# Patient Record
Sex: MALE | Race: AMERICAN INDIAN OR ALASKA NATIVE | NOT HISPANIC OR LATINO | Employment: OTHER | URBAN - METROPOLITAN AREA
[De-identification: names, ages, dates, MRNs, and addresses within clinical notes are randomized per-mention and may not be internally consistent; named-entity substitution may affect disease eponyms.]

---

## 2023-08-12 ENCOUNTER — HOSPITAL ENCOUNTER (OUTPATIENT)
Dept: LAB | Age: 68
Discharge: HOME | End: 2023-08-12
Attending: SPECIALIST

## 2023-08-12 DIAGNOSIS — R79.9 ABNORMAL BLOOD CHEMISTRY: ICD-10-CM

## 2023-08-12 DIAGNOSIS — R53.81 DEBILITY: ICD-10-CM

## 2023-08-12 DIAGNOSIS — Z00.00 ROUTINE GENERAL MEDICAL EXAMINATION AT A HEALTH CARE FACILITY: Primary | ICD-10-CM

## 2023-08-12 DIAGNOSIS — E55.9 AVITAMINOSIS D: ICD-10-CM

## 2023-08-12 DIAGNOSIS — R97.20 ELEVATED PROSTATE SPECIFIC ANTIGEN (PSA): ICD-10-CM

## 2023-08-12 DIAGNOSIS — Z00.00 ROUTINE GENERAL MEDICAL EXAMINATION AT A HEALTH CARE FACILITY: ICD-10-CM

## 2023-08-12 LAB
25(OH)D3+25(OH)D2 SERPL-MCNC: 44 NG/ML (ref 30–100)
ALBUMIN SERPL-MCNC: 3.9 G/DL (ref 3.6–5.1)
ALBUMIN/GLOB SERPL: 1.2 {RATIO} (ref 1–2.4)
ALP SERPL-CCNC: 51 UNITS/L (ref 45–117)
ALT SERPL-CCNC: 41 UNITS/L
ANION GAP SERPL CALC-SCNC: 11 MMOL/L (ref 7–19)
APPEARANCE UR: CLEAR
AST SERPL-CCNC: 30 UNITS/L
BACTERIA #/AREA URNS HPF: ABNORMAL /HPF
BILIRUB SERPL-MCNC: 0.4 MG/DL (ref 0.2–1)
BILIRUB UR QL STRIP: NEGATIVE
BUN SERPL-MCNC: 11 MG/DL (ref 6–20)
BUN/CREAT SERPL: 12 (ref 7–25)
CALCIUM SERPL-MCNC: 9.2 MG/DL (ref 8.4–10.2)
CHLORIDE SERPL-SCNC: 104 MMOL/L (ref 97–110)
CHOLEST SERPL-MCNC: 111 MG/DL
CHOLEST/HDLC SERPL: 3.7 {RATIO}
CO2 SERPL-SCNC: 27 MMOL/L (ref 21–32)
COLOR UR: ABNORMAL
CREAT SERPL-MCNC: 0.89 MG/DL (ref 0.67–1.17)
DEPRECATED RDW RBC: 39.9 FL (ref 39–50)
ERYTHROCYTE [DISTWIDTH] IN BLOOD: 13 % (ref 11–15)
FASTING DURATION TIME PATIENT: NORMAL H
FOLATE SERPL-MCNC: 20.7 NG/ML
GFR SERPLBLD BASED ON 1.73 SQ M-ARVRAT: >90 ML/MIN
GLOBULIN SER-MCNC: 3.3 G/DL (ref 2–4)
GLUCOSE SERPL-MCNC: 90 MG/DL (ref 70–99)
GLUCOSE UR STRIP-MCNC: NEGATIVE MG/DL
HBA1C MFR BLD: 6.7 % (ref 4.5–5.6)
HCT VFR BLD CALC: 43.7 % (ref 39–51)
HDLC SERPL-MCNC: 30 MG/DL
HGB BLD-MCNC: 13.9 G/DL (ref 13–17)
HGB UR QL STRIP: NEGATIVE
HYALINE CASTS #/AREA URNS LPF: ABNORMAL /LPF
KETONES UR STRIP-MCNC: NEGATIVE MG/DL
LDLC SERPL CALC-MCNC: 49 MG/DL
LEUKOCYTE ESTERASE UR QL STRIP: ABNORMAL
MCH RBC QN AUTO: 26.7 PG (ref 26–34)
MCHC RBC AUTO-ENTMCNC: 31.8 G/DL (ref 32–36.5)
MCV RBC AUTO: 84 FL (ref 78–100)
MUCOUS THREADS URNS QL MICRO: PRESENT
NITRITE UR QL STRIP: NEGATIVE
NONHDLC SERPL-MCNC: 81 MG/DL
NRBC BLD MANUAL-RTO: 0 /100 WBC
PH UR STRIP: 6 [PH] (ref 5–7)
PLATELET # BLD AUTO: 309 K/MCL (ref 140–450)
POTASSIUM SERPL-SCNC: 4.5 MMOL/L (ref 3.4–5.1)
PROT SERPL-MCNC: 7.2 G/DL (ref 6.4–8.2)
PROT UR STRIP-MCNC: NEGATIVE MG/DL
PSA SERPL-MCNC: 0.59 NG/ML
RBC # BLD: 5.2 MIL/MCL (ref 4.5–5.9)
RBC #/AREA URNS HPF: ABNORMAL /HPF
SODIUM SERPL-SCNC: 137 MMOL/L (ref 135–145)
SP GR UR STRIP: 1.02 (ref 1–1.03)
SQUAMOUS #/AREA URNS HPF: ABNORMAL /HPF
T4 SERPL-MCNC: 10.5 MCG/DL (ref 4.7–13.3)
TRIGL SERPL-MCNC: 158 MG/DL
TSH SERPL-ACNC: 1.95 MCUNITS/ML (ref 0.35–5)
URATE SERPL-MCNC: 3.2 MG/DL (ref 3.5–7.2)
UROBILINOGEN UR STRIP-MCNC: 0.2 MG/DL
VIT B12 SERPL-MCNC: 840 PG/ML (ref 211–911)
WBC # BLD: 8.1 K/MCL (ref 4.2–11)
WBC #/AREA URNS HPF: ABNORMAL /HPF

## 2023-08-12 PROCEDURE — 81001 URINALYSIS AUTO W/SCOPE: CPT | Performed by: SPECIALIST

## 2023-08-12 PROCEDURE — 80061 LIPID PANEL: CPT | Performed by: SPECIALIST

## 2023-08-12 PROCEDURE — 87086 URINE CULTURE/COLONY COUNT: CPT | Performed by: SPECIALIST

## 2023-08-12 PROCEDURE — 80053 COMPREHEN METABOLIC PANEL: CPT | Performed by: SPECIALIST

## 2023-08-12 PROCEDURE — 36415 COLL VENOUS BLD VENIPUNCTURE: CPT | Performed by: SPECIALIST

## 2023-08-12 PROCEDURE — 83036 HEMOGLOBIN GLYCOSYLATED A1C: CPT | Performed by: SPECIALIST

## 2023-08-12 PROCEDURE — 84443 ASSAY THYROID STIM HORMONE: CPT | Performed by: SPECIALIST

## 2023-08-12 PROCEDURE — 84550 ASSAY OF BLOOD/URIC ACID: CPT | Performed by: SPECIALIST

## 2023-08-12 PROCEDURE — 82306 VITAMIN D 25 HYDROXY: CPT | Performed by: SPECIALIST

## 2023-08-12 PROCEDURE — 82746 ASSAY OF FOLIC ACID SERUM: CPT | Performed by: SPECIALIST

## 2023-08-12 PROCEDURE — 84153 ASSAY OF PSA TOTAL: CPT | Performed by: SPECIALIST

## 2023-08-12 PROCEDURE — 84436 ASSAY OF TOTAL THYROXINE: CPT | Performed by: SPECIALIST

## 2023-08-12 PROCEDURE — 85027 COMPLETE CBC AUTOMATED: CPT | Performed by: SPECIALIST

## 2023-08-13 LAB — BACTERIA UR CULT: NO GROWTH

## 2023-11-11 ENCOUNTER — HOSPITAL ENCOUNTER (OUTPATIENT)
Dept: LAB | Age: 68
Discharge: HOME | End: 2023-11-11

## 2024-01-18 ENCOUNTER — LAB SERVICES (OUTPATIENT)
Dept: LAB | Age: 69
End: 2024-01-18

## 2024-01-18 ENCOUNTER — LAB SERVICES (OUTPATIENT)
Dept: LAB | Age: 69
End: 2024-01-18
Attending: SPECIALIST

## 2024-01-18 DIAGNOSIS — Z00.00 ENCOUNTER FOR GENERAL ADULT MEDICAL EXAMINATION WITHOUT ABNORMAL FINDINGS: Primary | ICD-10-CM

## 2024-01-18 DIAGNOSIS — R79.9 ABNORMAL FINDING OF BLOOD CHEMISTRY, UNSPECIFIED: ICD-10-CM

## 2024-01-18 DIAGNOSIS — R53.81 OTHER MALAISE: ICD-10-CM

## 2024-01-18 DIAGNOSIS — E11.9 TYPE 2 DIABETES MELLITUS WITHOUT COMPLICATIONS (CMD): ICD-10-CM

## 2024-01-18 DIAGNOSIS — E55.9 VITAMIN D DEFICIENCY, UNSPECIFIED: ICD-10-CM

## 2024-01-18 LAB
25(OH)D3+25(OH)D2 SERPL-MCNC: 39.2 NG/ML (ref 30–100)
ALBUMIN SERPL-MCNC: 4 G/DL (ref 3.6–5.1)
ALBUMIN/GLOB SERPL: 1.2 {RATIO} (ref 1–2.4)
ALP SERPL-CCNC: 57 UNITS/L (ref 45–117)
ALT SERPL-CCNC: 34 UNITS/L
ANION GAP SERPL CALC-SCNC: 9 MMOL/L (ref 7–19)
APPEARANCE UR: CLEAR
AST SERPL-CCNC: 22 UNITS/L
BACTERIA #/AREA URNS HPF: ABNORMAL /HPF
BILIRUB SERPL-MCNC: 0.4 MG/DL (ref 0.2–1)
BILIRUB UR QL STRIP: NEGATIVE
BUN SERPL-MCNC: 11 MG/DL (ref 6–20)
BUN/CREAT SERPL: 13 (ref 7–25)
CALCIUM SERPL-MCNC: 9.6 MG/DL (ref 8.4–10.2)
CHLORIDE SERPL-SCNC: 106 MMOL/L (ref 97–110)
CHOLEST SERPL-MCNC: 114 MG/DL
CHOLEST/HDLC SERPL: 3.7 {RATIO}
CO2 SERPL-SCNC: 26 MMOL/L (ref 21–32)
COLOR UR: COLORLESS
CREAT SERPL-MCNC: 0.88 MG/DL (ref 0.67–1.17)
CREAT UR-MCNC: 59.5 MG/DL
DEPRECATED RDW RBC: 38.5 FL (ref 39–50)
EGFRCR SERPLBLD CKD-EPI 2021: >90 ML/MIN/{1.73_M2}
ERYTHROCYTE [DISTWIDTH] IN BLOOD: 12.5 % (ref 11–15)
FASTING DURATION TIME PATIENT: 12 HOURS (ref 0–999)
FOLATE SERPL-MCNC: 8.5 NG/ML
GLOBULIN SER-MCNC: 3.4 G/DL (ref 2–4)
GLUCOSE SERPL-MCNC: 102 MG/DL (ref 70–99)
GLUCOSE UR STRIP-MCNC: NEGATIVE MG/DL
HBA1C MFR BLD: 6.8 % (ref 4.5–5.6)
HCT VFR BLD CALC: 42.5 % (ref 39–51)
HDLC SERPL-MCNC: 31 MG/DL
HGB BLD-MCNC: 13.7 G/DL (ref 13–17)
HGB UR QL STRIP: NEGATIVE
HYALINE CASTS #/AREA URNS LPF: ABNORMAL /LPF
KETONES UR STRIP-MCNC: NEGATIVE MG/DL
LDLC SERPL CALC-MCNC: 56 MG/DL
LEUKOCYTE ESTERASE UR QL STRIP: ABNORMAL
MCH RBC QN AUTO: 27.5 PG (ref 26–34)
MCHC RBC AUTO-ENTMCNC: 32.2 G/DL (ref 32–36.5)
MCV RBC AUTO: 85.2 FL (ref 78–100)
MICROALBUMIN UR-MCNC: <0.5 MG/DL
MICROALBUMIN/CREAT UR: NORMAL MG/G{CREAT}
MUCOUS THREADS URNS QL MICRO: PRESENT
NITRITE UR QL STRIP: NEGATIVE
NONHDLC SERPL-MCNC: 83 MG/DL
NRBC BLD MANUAL-RTO: 0 /100 WBC
PH UR STRIP: 5 [PH] (ref 5–7)
PLATELET # BLD AUTO: 325 K/MCL (ref 140–450)
POTASSIUM SERPL-SCNC: 5.1 MMOL/L (ref 3.4–5.1)
PROT SERPL-MCNC: 7.4 G/DL (ref 6.4–8.2)
PROT UR STRIP-MCNC: NEGATIVE MG/DL
RBC # BLD: 4.99 MIL/MCL (ref 4.5–5.9)
RBC #/AREA URNS HPF: ABNORMAL /HPF
SODIUM SERPL-SCNC: 136 MMOL/L (ref 135–145)
SP GR UR STRIP: 1.01 (ref 1–1.03)
SQUAMOUS #/AREA URNS HPF: ABNORMAL /HPF
T4 SERPL-MCNC: 11.2 MCG/DL (ref 4.7–13.3)
TRIGL SERPL-MCNC: 137 MG/DL
TSH SERPL-ACNC: 2.13 MCUNITS/ML (ref 0.35–5)
URATE SERPL-MCNC: 2.6 MG/DL (ref 3.5–7.2)
UROBILINOGEN UR STRIP-MCNC: 0.2 MG/DL
VIT B12 SERPL-MCNC: 1956 PG/ML (ref 211–911)
WBC # BLD: 7.9 K/MCL (ref 4.2–11)
WBC #/AREA URNS HPF: ABNORMAL /HPF

## 2024-01-18 PROCEDURE — 80053 COMPREHEN METABOLIC PANEL: CPT

## 2024-01-18 PROCEDURE — 82607 VITAMIN B-12: CPT

## 2024-01-18 PROCEDURE — 84550 ASSAY OF BLOOD/URIC ACID: CPT

## 2024-01-18 PROCEDURE — 81001 URINALYSIS AUTO W/SCOPE: CPT

## 2024-01-18 PROCEDURE — 84443 ASSAY THYROID STIM HORMONE: CPT

## 2024-01-18 PROCEDURE — 82043 UR ALBUMIN QUANTITATIVE: CPT

## 2024-01-18 PROCEDURE — 82306 VITAMIN D 25 HYDROXY: CPT

## 2024-01-18 PROCEDURE — 84436 ASSAY OF TOTAL THYROXINE: CPT

## 2024-01-18 PROCEDURE — 87186 SC STD MICRODIL/AGAR DIL: CPT

## 2024-01-18 PROCEDURE — 83036 HEMOGLOBIN GLYCOSYLATED A1C: CPT

## 2024-01-18 PROCEDURE — 85027 COMPLETE CBC AUTOMATED: CPT

## 2024-01-18 PROCEDURE — 36415 COLL VENOUS BLD VENIPUNCTURE: CPT

## 2024-01-18 PROCEDURE — 80061 LIPID PANEL: CPT

## 2024-01-20 LAB — BACTERIA UR CULT: ABNORMAL

## 2024-02-13 ENCOUNTER — APPOINTMENT (OUTPATIENT)
Dept: AUDIOLOGY | Age: 69
End: 2024-02-13

## 2024-02-13 DIAGNOSIS — H90.3 SENSORINEURAL HEARING LOSS (SNHL) OF BOTH EARS: Primary | ICD-10-CM

## 2024-02-13 PROCEDURE — 92557 COMPREHENSIVE HEARING TEST: CPT | Performed by: AUDIOLOGIST

## 2024-03-29 ENCOUNTER — TELEPHONE (OUTPATIENT)
Dept: CARDIOLOGY | Age: 69
End: 2024-03-29

## 2024-04-03 ENCOUNTER — LAB SERVICES (OUTPATIENT)
Dept: LAB | Age: 69
End: 2024-04-03

## 2024-04-03 ENCOUNTER — OFFICE VISIT (OUTPATIENT)
Dept: CARDIOLOGY | Age: 69
End: 2024-04-03

## 2024-04-03 VITALS
HEIGHT: 65 IN | HEART RATE: 72 BPM | BODY MASS INDEX: 22.63 KG/M2 | DIASTOLIC BLOOD PRESSURE: 73 MMHG | WEIGHT: 135.8 LBS | SYSTOLIC BLOOD PRESSURE: 111 MMHG

## 2024-04-03 DIAGNOSIS — I25.810 CORONARY ARTERY DISEASE INVOLVING AUTOLOGOUS ARTERY CORONARY BYPASS GRAFT WITHOUT ANGINA PECTORIS: ICD-10-CM

## 2024-04-03 DIAGNOSIS — I10 HYPERTENSION, UNSPECIFIED TYPE: Primary | ICD-10-CM

## 2024-04-03 DIAGNOSIS — N39.0 UTI (URINARY TRACT INFECTION): Primary | ICD-10-CM

## 2024-04-03 DIAGNOSIS — E78.5 HYPERLIPIDEMIA, UNSPECIFIED HYPERLIPIDEMIA TYPE: ICD-10-CM

## 2024-04-03 DIAGNOSIS — R06.02 SOB (SHORTNESS OF BREATH): ICD-10-CM

## 2024-04-03 LAB
APPEARANCE UR: CLEAR
BACTERIA #/AREA URNS HPF: ABNORMAL /HPF
BILIRUB UR QL STRIP: NEGATIVE
COLOR UR: COLORLESS
GLUCOSE UR STRIP-MCNC: >1000 MG/DL
HGB UR QL STRIP: NEGATIVE
HYALINE CASTS #/AREA URNS LPF: ABNORMAL /LPF
KETONES UR STRIP-MCNC: NEGATIVE MG/DL
LEUKOCYTE ESTERASE UR QL STRIP: ABNORMAL
NITRITE UR QL STRIP: NEGATIVE
PH UR STRIP: 7.5 [PH] (ref 5–7)
PROT UR STRIP-MCNC: NEGATIVE MG/DL
RBC #/AREA URNS HPF: ABNORMAL /HPF
SP GR UR STRIP: 1.02 (ref 1–1.03)
SQUAMOUS #/AREA URNS HPF: ABNORMAL /HPF
UROBILINOGEN UR STRIP-MCNC: 0.2 MG/DL
WBC #/AREA URNS HPF: ABNORMAL /HPF

## 2024-04-03 PROCEDURE — 87086 URINE CULTURE/COLONY COUNT: CPT

## 2024-04-03 PROCEDURE — 81001 URINALYSIS AUTO W/SCOPE: CPT

## 2024-04-03 RX ORDER — TELMISARTAN 40 MG/1
40 TABLET ORAL DAILY
COMMUNITY
End: 2024-04-03 | Stop reason: SDUPTHER

## 2024-04-03 RX ORDER — NITROFURANTOIN MACROCRYSTALS 100 MG/1
CAPSULE ORAL
COMMUNITY
Start: 2024-02-26 | End: 2024-04-03

## 2024-04-03 RX ORDER — ROSUVASTATIN CALCIUM 20 MG/1
20 TABLET, COATED ORAL DAILY
COMMUNITY
Start: 2023-11-27 | End: 2024-04-03 | Stop reason: SDUPTHER

## 2024-04-03 RX ORDER — TELMISARTAN 40 MG/1
40 TABLET ORAL DAILY
Qty: 90 TABLET | Refills: 3 | Status: SHIPPED | OUTPATIENT
Start: 2024-04-03

## 2024-04-03 RX ORDER — CLOPIDOGREL BISULFATE 75 MG/1
75 TABLET ORAL DAILY
COMMUNITY
End: 2024-04-03 | Stop reason: SDUPTHER

## 2024-04-03 RX ORDER — CLOPIDOGREL BISULFATE 75 MG/1
75 TABLET ORAL DAILY
Qty: 90 TABLET | Refills: 3 | Status: SHIPPED | OUTPATIENT
Start: 2024-04-03

## 2024-04-03 RX ORDER — PREDNISONE 10 MG/1
TABLET ORAL
COMMUNITY
Start: 2024-01-16 | End: 2024-04-03

## 2024-04-03 RX ORDER — ROSUVASTATIN CALCIUM 20 MG/1
20 TABLET, COATED ORAL DAILY
Qty: 90 TABLET | Refills: 3 | Status: SHIPPED | OUTPATIENT
Start: 2024-04-03

## 2024-04-03 RX ORDER — FENOFIBRATE 160 MG/1
TABLET ORAL
COMMUNITY
Start: 2024-02-19

## 2024-04-03 RX ORDER — METFORMIN HYDROCHLORIDE 500 MG/1
TABLET, EXTENDED RELEASE ORAL
COMMUNITY
Start: 2024-02-19

## 2024-04-03 RX ORDER — DOXYCYCLINE HYCLATE 100 MG
100 TABLET ORAL 2 TIMES DAILY
COMMUNITY
Start: 2024-01-16 | End: 2024-04-03

## 2024-04-03 SDOH — HEALTH STABILITY: PHYSICAL HEALTH: ON AVERAGE, HOW MANY MINUTES DO YOU ENGAGE IN EXERCISE AT THIS LEVEL?: 30 MIN

## 2024-04-03 SDOH — HEALTH STABILITY: PHYSICAL HEALTH: ON AVERAGE, HOW MANY DAYS PER WEEK DO YOU ENGAGE IN MODERATE TO STRENUOUS EXERCISE (LIKE A BRISK WALK)?: 7 DAYS

## 2024-04-03 ASSESSMENT — PATIENT HEALTH QUESTIONNAIRE - PHQ9
2. FEELING DOWN, DEPRESSED OR HOPELESS: NOT AT ALL
CLINICAL INTERPRETATION OF PHQ2 SCORE: NO FURTHER SCREENING NEEDED
SUM OF ALL RESPONSES TO PHQ9 QUESTIONS 1 AND 2: 0
SUM OF ALL RESPONSES TO PHQ9 QUESTIONS 1 AND 2: 0
1. LITTLE INTEREST OR PLEASURE IN DOING THINGS: NOT AT ALL

## 2024-04-05 LAB — BACTERIA UR CULT: ABNORMAL

## 2024-09-16 ENCOUNTER — APPOINTMENT (OUTPATIENT)
Dept: CARDIOLOGY | Age: 69
End: 2024-09-16
Attending: INTERNAL MEDICINE

## 2024-09-17 ENCOUNTER — LAB SERVICES (OUTPATIENT)
Dept: FAMILY MEDICINE | Age: 69
End: 2024-09-17

## 2024-09-17 DIAGNOSIS — R53.81 DEBILITY: ICD-10-CM

## 2024-09-17 DIAGNOSIS — E13.22 OTHER SPECIFIED DIABETES MELLITUS WITH STAGE 3 CHRONIC KIDNEY DISEASE, UNSPECIFIED WHETHER LONG TERM INSULIN USE, UNSPECIFIED WHETHER STAGE 3A OR 3B CKD  (CMD): ICD-10-CM

## 2024-09-17 DIAGNOSIS — E53.8 ADENOSYLCOBALAMIN SYNTHESIS DEFECT: ICD-10-CM

## 2024-09-17 DIAGNOSIS — E55.9 AVITAMINOSIS D: ICD-10-CM

## 2024-09-17 DIAGNOSIS — R79.9 ABNORMAL BLOOD CHEMISTRY: ICD-10-CM

## 2024-09-17 DIAGNOSIS — R97.20 ELEVATED PROSTATE SPECIFIC ANTIGEN (PSA): ICD-10-CM

## 2024-09-17 DIAGNOSIS — N18.30 OTHER SPECIFIED DIABETES MELLITUS WITH STAGE 3 CHRONIC KIDNEY DISEASE, UNSPECIFIED WHETHER LONG TERM INSULIN USE, UNSPECIFIED WHETHER STAGE 3A OR 3B CKD  (CMD): ICD-10-CM

## 2024-09-17 DIAGNOSIS — E78.5 HYPERLIPIDEMIA, UNSPECIFIED HYPERLIPIDEMIA TYPE: ICD-10-CM

## 2024-09-17 DIAGNOSIS — Z00.00 ROUTINE GENERAL MEDICAL EXAMINATION AT A HEALTH CARE FACILITY: Primary | ICD-10-CM

## 2024-09-17 LAB
25(OH)D3+25(OH)D2 SERPL-MCNC: 62.9 NG/ML (ref 30–100)
ALBUMIN SERPL-MCNC: 4.1 G/DL (ref 3.6–5.1)
ALBUMIN/GLOB SERPL: 1.3 {RATIO} (ref 1–2.4)
ALP SERPL-CCNC: 64 UNITS/L (ref 45–117)
ALT SERPL-CCNC: 40 UNITS/L
ANION GAP SERPL CALC-SCNC: 12 MMOL/L (ref 7–19)
APPEARANCE UR: CLEAR
AST SERPL-CCNC: 28 UNITS/L
BACTERIA #/AREA URNS HPF: ABNORMAL /HPF
BASOPHILS # BLD: 0.1 K/MCL (ref 0–0.3)
BASOPHILS NFR BLD: 1 %
BILIRUB SERPL-MCNC: 0.4 MG/DL (ref 0.2–1)
BILIRUB UR QL STRIP: NEGATIVE
BUN SERPL-MCNC: 8 MG/DL (ref 6–20)
BUN/CREAT SERPL: 9 (ref 7–25)
CALCIUM SERPL-MCNC: 9.7 MG/DL (ref 8.4–10.2)
CHLORIDE SERPL-SCNC: 103 MMOL/L (ref 97–110)
CHOLEST SERPL-MCNC: 134 MG/DL
CHOLEST/HDLC SERPL: 5.4 {RATIO}
CO2 SERPL-SCNC: 26 MMOL/L (ref 21–32)
COLOR UR: COLORLESS
CREAT SERPL-MCNC: 0.9 MG/DL (ref 0.67–1.17)
CREAT UR-MCNC: 22.05 MG/DL
DEPRECATED RDW RBC: 40.9 FL (ref 39–50)
EGFRCR SERPLBLD CKD-EPI 2021: >90 ML/MIN/{1.73_M2}
EOSINOPHIL # BLD: 0.4 K/MCL (ref 0–0.5)
EOSINOPHIL NFR BLD: 6 %
ERYTHROCYTE [DISTWIDTH] IN BLOOD: 13.6 % (ref 11–15)
FASTING DURATION TIME PATIENT: ABNORMAL H
GLOBULIN SER-MCNC: 3.2 G/DL (ref 2–4)
GLUCOSE SERPL-MCNC: 114 MG/DL (ref 70–99)
GLUCOSE UR STRIP-MCNC: NEGATIVE MG/DL
HBA1C MFR BLD: 7.8 % (ref 4.5–5.6)
HCT VFR BLD CALC: 42.4 % (ref 39–51)
HDLC SERPL-MCNC: 25 MG/DL
HGB BLD-MCNC: 13.7 G/DL (ref 13–17)
HGB UR QL STRIP: NEGATIVE
HYALINE CASTS #/AREA URNS LPF: ABNORMAL /LPF
IMM GRANULOCYTES # BLD AUTO: 0 K/MCL (ref 0–0.2)
IMM GRANULOCYTES # BLD: 0 %
KETONES UR STRIP-MCNC: NEGATIVE MG/DL
LDLC SERPL CALC-MCNC: 69 MG/DL
LEUKOCYTE ESTERASE UR QL STRIP: ABNORMAL
LYMPHOCYTES # BLD: 2.1 K/MCL (ref 1–4)
LYMPHOCYTES NFR BLD: 34 %
MCH RBC QN AUTO: 26.7 PG (ref 26–34)
MCHC RBC AUTO-ENTMCNC: 32.3 G/DL (ref 32–36.5)
MCV RBC AUTO: 82.5 FL (ref 78–100)
MICROALBUMIN UR-MCNC: <0.5 MG/DL
MICROALBUMIN/CREAT UR: NORMAL MG/G{CREAT}
MONOCYTES # BLD: 0.4 K/MCL (ref 0.3–0.9)
MONOCYTES NFR BLD: 6 %
NEUTROPHILS # BLD: 3.3 K/MCL (ref 1.8–7.7)
NEUTROPHILS NFR BLD: 53 %
NITRITE UR QL STRIP: NEGATIVE
NONHDLC SERPL-MCNC: 109 MG/DL
NRBC BLD MANUAL-RTO: 0 /100 WBC
PH UR STRIP: 6 [PH] (ref 5–7)
PLATELET # BLD AUTO: 304 K/MCL (ref 140–450)
POTASSIUM SERPL-SCNC: 5.1 MMOL/L (ref 3.4–5.1)
PROT SERPL-MCNC: 7.3 G/DL (ref 6.4–8.2)
PROT UR STRIP-MCNC: NEGATIVE MG/DL
PSA SERPL-MCNC: 0.71 NG/ML
RBC # BLD: 5.14 MIL/MCL (ref 4.5–5.9)
RBC #/AREA URNS HPF: ABNORMAL /HPF
SODIUM SERPL-SCNC: 136 MMOL/L (ref 135–145)
SP GR UR STRIP: 1 (ref 1–1.03)
SQUAMOUS #/AREA URNS HPF: ABNORMAL /HPF
TRANS CELLS #/AREA URNS HPF: ABNORMAL /HPF
TRIGL SERPL-MCNC: 198 MG/DL
TSH SERPL-ACNC: 1.88 MCUNITS/ML (ref 0.35–5)
URATE SERPL-MCNC: 2.7 MG/DL (ref 3.5–7.2)
UROBILINOGEN UR STRIP-MCNC: 0.2 MG/DL
WBC # BLD: 6.3 K/MCL (ref 4.2–11)
WBC #/AREA URNS HPF: ABNORMAL /HPF

## 2024-09-17 PROCEDURE — 82746 ASSAY OF FOLIC ACID SERUM: CPT | Performed by: CLINICAL MEDICAL LABORATORY

## 2024-09-17 PROCEDURE — 87086 URINE CULTURE/COLONY COUNT: CPT | Performed by: CLINICAL MEDICAL LABORATORY

## 2024-09-17 PROCEDURE — 84153 ASSAY OF PSA TOTAL: CPT | Performed by: CLINICAL MEDICAL LABORATORY

## 2024-09-17 PROCEDURE — 82607 VITAMIN B-12: CPT | Performed by: CLINICAL MEDICAL LABORATORY

## 2024-09-17 PROCEDURE — 84436 ASSAY OF TOTAL THYROXINE: CPT | Performed by: CLINICAL MEDICAL LABORATORY

## 2024-09-18 ENCOUNTER — APPOINTMENT (OUTPATIENT)
Dept: CARDIOLOGY | Age: 69
End: 2024-09-18
Attending: INTERNAL MEDICINE

## 2024-09-18 DIAGNOSIS — R06.02 SOB (SHORTNESS OF BREATH): ICD-10-CM

## 2024-09-18 LAB
AORTIC VALVE AREA (AVA): 0.66
AV PEAK GRADIENT (AVPG): 7
AV PEAK VELOCITY (AVPV): 1.34
AV STENOSIS SEVERITY TEXT: NORMAL
AVI LVOT PEAK GRADIENT (LVOTMG): 0.7
E WAVE DECELARATION TIME (MDT): 8.5
FOLATE SERPL-MCNC: 6.2 NG/ML
LEFT INTERNAL DIMENSION IN SYSTOLE (LVSD): 0.8
LEFT VENTRICULAR INTERNAL DIMENSION IN DIASTOLE (LVDD): 2.4
LEFT VENTRICULAR POSTERIOR WALL IN END DIASTOLE (LVPW): 3.6
LV EF: NORMAL %
LVOT VTI (LVOTVTI): 1.2
MV E TISSUE VEL MED (MESV): 11.4
MV E WAVE VEL/E TISSUE VEL MED(MSR): 7.71
MV PEAK A VELOCITY (MVPAV): 196
MV PEAK E VELOCITY (MVPEV): 0.71
RV END SYSTOLIC LONGITUDINAL STRAIN FREE WALL (RVGS): 2
T4 SERPL-MCNC: 12.5 MCG/DL (ref 4.7–13.3)
TRICUSPID VALVE PEAK REGURGITATION VELOCITY (TRPV): 2.9
TV ESTIMATED RIGHT ARTERIAL PRESSURE (RAP): 12.6
VIT B12 SERPL-MCNC: 385 PG/ML (ref 211–911)

## 2024-09-18 PROCEDURE — 93306 TTE W/DOPPLER COMPLETE: CPT | Performed by: INTERNAL MEDICINE

## 2024-09-19 LAB — BACTERIA UR CULT: NO GROWTH

## 2024-10-07 ENCOUNTER — APPOINTMENT (OUTPATIENT)
Dept: CARDIOLOGY | Age: 69
End: 2024-10-07

## 2024-10-07 VITALS
WEIGHT: 134.04 LBS | OXYGEN SATURATION: 97 % | SYSTOLIC BLOOD PRESSURE: 114 MMHG | RESPIRATION RATE: 18 BRPM | BODY MASS INDEX: 22.33 KG/M2 | HEIGHT: 65 IN | DIASTOLIC BLOOD PRESSURE: 58 MMHG | HEART RATE: 65 BPM

## 2024-10-07 DIAGNOSIS — R09.89 CAROTID BRUIT, UNSPECIFIED LATERALITY: ICD-10-CM

## 2024-10-07 DIAGNOSIS — E78.2 MIXED HYPERLIPIDEMIA: ICD-10-CM

## 2024-10-07 DIAGNOSIS — R06.02 SOB (SHORTNESS OF BREATH): ICD-10-CM

## 2024-10-07 DIAGNOSIS — I1A.0 RESISTANT HYPERTENSION: Primary | ICD-10-CM

## 2024-10-07 DIAGNOSIS — I25.810 CORONARY ARTERY DISEASE INVOLVING AUTOLOGOUS ARTERY CORONARY BYPASS GRAFT WITHOUT ANGINA PECTORIS: ICD-10-CM

## 2024-10-07 PROCEDURE — 3074F SYST BP LT 130 MM HG: CPT | Performed by: INTERNAL MEDICINE

## 2024-10-07 PROCEDURE — 3078F DIAST BP <80 MM HG: CPT | Performed by: INTERNAL MEDICINE

## 2024-10-07 PROCEDURE — 99214 OFFICE O/P EST MOD 30 MIN: CPT | Performed by: INTERNAL MEDICINE

## 2024-10-07 SDOH — HEALTH STABILITY: PHYSICAL HEALTH: ON AVERAGE, HOW MANY DAYS PER WEEK DO YOU ENGAGE IN MODERATE TO STRENUOUS EXERCISE (LIKE A BRISK WALK)?: 7 DAYS

## 2024-10-07 SDOH — HEALTH STABILITY: PHYSICAL HEALTH: ON AVERAGE, HOW MANY MINUTES DO YOU ENGAGE IN EXERCISE AT THIS LEVEL?: 20 MIN

## 2024-10-07 ASSESSMENT — PATIENT HEALTH QUESTIONNAIRE - PHQ9
CLINICAL INTERPRETATION OF PHQ2 SCORE: NO FURTHER SCREENING NEEDED
2. FEELING DOWN, DEPRESSED OR HOPELESS: NOT AT ALL
SUM OF ALL RESPONSES TO PHQ9 QUESTIONS 1 AND 2: 0
1. LITTLE INTEREST OR PLEASURE IN DOING THINGS: NOT AT ALL
SUM OF ALL RESPONSES TO PHQ9 QUESTIONS 1 AND 2: 0

## 2024-11-04 ENCOUNTER — TELEPHONE (OUTPATIENT)
Dept: CARDIOLOGY | Age: 69
End: 2024-11-04

## 2024-12-14 ENCOUNTER — TELEPHONE (OUTPATIENT)
Dept: CARDIOLOGY | Age: 69
End: 2024-12-14

## 2024-12-16 ENCOUNTER — TELEPHONE (OUTPATIENT)
Dept: CARDIOLOGY | Age: 69
End: 2024-12-16

## 2024-12-16 ENCOUNTER — HOSPITAL ENCOUNTER (OUTPATIENT)
Dept: LAB | Age: 69
Discharge: HOME | End: 2024-12-16

## 2024-12-16 DIAGNOSIS — E11.9 TYPE 2 DIABETES MELLITUS WITHOUT COMPLICATIONS  (CMD): ICD-10-CM

## 2024-12-16 DIAGNOSIS — R79.9 ABNORMAL FINDING OF BLOOD CHEMISTRY, UNSPECIFIED: ICD-10-CM

## 2024-12-16 DIAGNOSIS — E53.8 DEFICIENCY OF OTHER SPECIFIED B GROUP VITAMINS: ICD-10-CM

## 2024-12-16 DIAGNOSIS — I10 ESSENTIAL (PRIMARY) HYPERTENSION: Primary | ICD-10-CM

## 2024-12-16 DIAGNOSIS — E78.5 HYPERLIPIDEMIA, UNSPECIFIED: ICD-10-CM

## 2024-12-16 DIAGNOSIS — I10 ESSENTIAL (PRIMARY) HYPERTENSION: ICD-10-CM

## 2024-12-16 DIAGNOSIS — Z00.00 ENCOUNTER FOR GENERAL ADULT MEDICAL EXAMINATION WITHOUT ABNORMAL FINDINGS: ICD-10-CM

## 2024-12-16 PROCEDURE — 80053 COMPREHEN METABOLIC PANEL: CPT | Performed by: SPECIALIST

## 2024-12-16 PROCEDURE — 36415 COLL VENOUS BLD VENIPUNCTURE: CPT | Performed by: SPECIALIST

## 2024-12-16 PROCEDURE — 85027 COMPLETE CBC AUTOMATED: CPT | Performed by: SPECIALIST

## 2024-12-16 PROCEDURE — 80061 LIPID PANEL: CPT | Performed by: SPECIALIST

## 2024-12-16 PROCEDURE — 83036 HEMOGLOBIN GLYCOSYLATED A1C: CPT | Performed by: SPECIALIST

## 2024-12-16 PROCEDURE — 86140 C-REACTIVE PROTEIN: CPT | Performed by: SPECIALIST

## 2024-12-16 PROCEDURE — 81003 URINALYSIS AUTO W/O SCOPE: CPT | Performed by: CLINICAL MEDICAL LABORATORY

## 2024-12-16 PROCEDURE — 84443 ASSAY THYROID STIM HORMONE: CPT | Performed by: SPECIALIST

## 2024-12-17 LAB
ALBUMIN SERPL-MCNC: 4.5 G/DL (ref 3.4–5)
ALBUMIN/GLOB SERPL: 1.3 {RATIO} (ref 1–2.4)
ALP SERPL-CCNC: 71 UNITS/L (ref 45–117)
ALT SERPL-CCNC: 22 UNITS/L
ANION GAP SERPL CALC-SCNC: 11 MMOL/L (ref 7–19)
APPEARANCE UR: CLEAR
AST SERPL-CCNC: 20 UNITS/L
BILIRUB SERPL-MCNC: 0.5 MG/DL (ref 0.2–1)
BILIRUB UR QL STRIP: NEGATIVE
BUN SERPL-MCNC: 11 MG/DL (ref 6–20)
BUN/CREAT SERPL: 11 (ref 7–25)
CALCIUM SERPL-MCNC: 11.1 MG/DL (ref 8.4–10.2)
CHLORIDE SERPL-SCNC: 102 MMOL/L (ref 97–110)
CHOLEST SERPL-MCNC: 137 MG/DL
CHOLEST/HDLC SERPL: 5.1 {RATIO}
CO2 SERPL-SCNC: 25 MMOL/L (ref 21–32)
COLOR UR: COLORLESS
CREAT SERPL-MCNC: 0.98 MG/DL (ref 0.67–1.17)
CRP SERPL-MCNC: 20.3 MG/L
DEPRECATED RDW RBC: 41 FL (ref 39–50)
EGFRCR SERPLBLD CKD-EPI 2021: 85 ML/MIN/{1.73_M2}
ERYTHROCYTE [DISTWIDTH] IN BLOOD: 13.4 % (ref 11–15)
FASTING DURATION TIME PATIENT: 6 HOURS (ref 0–999)
GLOBULIN SER-MCNC: 3.4 G/DL (ref 2–4)
GLUCOSE SERPL-MCNC: 91 MG/DL (ref 70–99)
GLUCOSE UR STRIP-MCNC: NEGATIVE MG/DL
HBA1C MFR BLD: 7.3 % (ref 4.5–5.6)
HCT VFR BLD CALC: 42.7 % (ref 39–51)
HDLC SERPL-MCNC: 27 MG/DL
HGB BLD-MCNC: 13.8 G/DL (ref 13–17)
HGB UR QL STRIP: NEGATIVE
KETONES UR STRIP-MCNC: NEGATIVE MG/DL
LDLC SERPL CALC-MCNC: 73 MG/DL
LEUKOCYTE ESTERASE UR QL STRIP: NEGATIVE
MCH RBC QN AUTO: 27 PG (ref 26–34)
MCHC RBC AUTO-ENTMCNC: 32.3 G/DL (ref 32–36.5)
MCV RBC AUTO: 83.6 FL (ref 78–100)
NITRITE UR QL STRIP: NEGATIVE
NONHDLC SERPL-MCNC: 110 MG/DL
NRBC BLD MANUAL-RTO: 0 /100 WBC
PH UR STRIP: 6.5 [PH] (ref 5–7)
PLATELET # BLD AUTO: 358 K/MCL (ref 140–450)
POTASSIUM SERPL-SCNC: 4.5 MMOL/L (ref 3.4–5.1)
PROT SERPL-MCNC: 7.9 G/DL (ref 6.4–8.2)
PROT UR STRIP-MCNC: NEGATIVE MG/DL
RBC # BLD: 5.11 MIL/MCL (ref 4.5–5.9)
SODIUM SERPL-SCNC: 133 MMOL/L (ref 135–145)
SP GR UR STRIP: <1.005 (ref 1–1.03)
TRIGL SERPL-MCNC: 185 MG/DL
TSH SERPL-ACNC: 2.51 MCUNITS/ML (ref 0.35–5)
UROBILINOGEN UR STRIP-MCNC: 0.2 MG/DL
WBC # BLD: 12.3 K/MCL (ref 4.2–11)

## 2025-03-05 ENCOUNTER — TELEPHONE (OUTPATIENT)
Dept: CARDIOLOGY | Age: 70
End: 2025-03-05

## 2025-03-06 ENCOUNTER — TELEPHONE (OUTPATIENT)
Dept: CARDIOLOGY | Age: 70
End: 2025-03-06

## 2025-03-06 DIAGNOSIS — I25.810 CORONARY ARTERY DISEASE INVOLVING AUTOLOGOUS ARTERY CORONARY BYPASS GRAFT WITHOUT ANGINA PECTORIS: ICD-10-CM

## 2025-03-06 DIAGNOSIS — R09.89 CAROTID BRUIT, UNSPECIFIED LATERALITY: Primary | ICD-10-CM

## 2025-03-20 ENCOUNTER — APPOINTMENT (OUTPATIENT)
Dept: CARDIOLOGY | Age: 70
End: 2025-03-20
Attending: INTERNAL MEDICINE

## 2025-04-18 ENCOUNTER — LAB ENCOUNTER (OUTPATIENT)
Dept: LAB | Age: 70
End: 2025-04-18
Attending: SPECIALIST
Payer: COMMERCIAL

## 2025-04-18 ENCOUNTER — HOSPITAL ENCOUNTER (OUTPATIENT)
Dept: GENERAL RADIOLOGY | Age: 70
Discharge: HOME | End: 2025-04-18
Attending: SPECIALIST
Payer: COMMERCIAL

## 2025-04-18 DIAGNOSIS — E78.5 HYPERLIPEMIA: ICD-10-CM

## 2025-04-18 DIAGNOSIS — Z00.00 ROUTINE GENERAL MEDICAL EXAMINATION AT A HEALTH CARE FACILITY: ICD-10-CM

## 2025-04-18 DIAGNOSIS — M25.562 LEFT KNEE PAIN: ICD-10-CM

## 2025-04-18 DIAGNOSIS — R97.20 ELEVATED PROSTATE SPECIFIC ANTIGEN (PSA): Primary | ICD-10-CM

## 2025-04-18 DIAGNOSIS — E11.9 DIABETES MELLITUS (HCC): ICD-10-CM

## 2025-04-18 DIAGNOSIS — E55.9 AVITAMINOSIS D: ICD-10-CM

## 2025-04-18 DIAGNOSIS — R79.9 ABNORMAL BLOOD CHEMISTRY: ICD-10-CM

## 2025-04-18 DIAGNOSIS — R53.81 DEBILITY, UNSPECIFIED: ICD-10-CM

## 2025-04-18 DIAGNOSIS — E53.8 BIOTIN-(PROPIONYL-COA-CARBOXYLASE) LIGASE DEFICIENCY: ICD-10-CM

## 2025-04-18 LAB
ALBUMIN SERPL-MCNC: 5.1 G/DL (ref 3.2–4.8)
ALBUMIN/GLOB SERPL: 2.1 {RATIO} (ref 1–2)
ALP LIVER SERPL-CCNC: 47 U/L (ref 45–117)
ALT SERPL-CCNC: 17 U/L (ref 10–49)
ANION GAP SERPL CALC-SCNC: 9 MMOL/L (ref 0–18)
AST SERPL-CCNC: 26 U/L (ref ?–34)
BASOPHILS # BLD AUTO: 0.06 X10(3) UL (ref 0–0.2)
BASOPHILS NFR BLD AUTO: 0.8 %
BILIRUB SERPL-MCNC: 0.6 MG/DL (ref 0.2–1.1)
BILIRUB UR QL STRIP.AUTO: NEGATIVE
BUN BLD-MCNC: 8 MG/DL (ref 9–23)
CALCIUM BLD-MCNC: 10 MG/DL (ref 8.7–10.6)
CHLORIDE SERPL-SCNC: 103 MMOL/L (ref 98–112)
CHOLEST SERPL-MCNC: 108 MG/DL (ref ?–200)
CLARITY UR REFRACT.AUTO: CLEAR
CO2 SERPL-SCNC: 25 MMOL/L (ref 21–32)
COLOR UR AUTO: COLORLESS
CREAT BLD-MCNC: 1 MG/DL (ref 0.7–1.3)
CREAT UR-SCNC: 18.4 MG/DL
EGFRCR SERPLBLD CKD-EPI 2021: 82 ML/MIN/1.73M2 (ref 60–?)
EOSINOPHIL # BLD AUTO: 0.33 X10(3) UL (ref 0–0.7)
EOSINOPHIL NFR BLD AUTO: 4.6 %
ERYTHROCYTE [DISTWIDTH] IN BLOOD BY AUTOMATED COUNT: 13.6 %
EST. AVERAGE GLUCOSE BLD GHB EST-MCNC: 154 MG/DL (ref 68–126)
FASTING PATIENT LIPID ANSWER: YES
FASTING STATUS PATIENT QL REPORTED: YES
FOLATE SERPL-MCNC: 8.5 NG/ML (ref 5.4–?)
GLOBULIN PLAS-MCNC: 2.4 G/DL (ref 2–3.5)
GLUCOSE BLD-MCNC: 98 MG/DL (ref 70–99)
GLUCOSE UR STRIP.AUTO-MCNC: NORMAL MG/DL
HBA1C MFR BLD: 7 % (ref ?–5.7)
HCT VFR BLD AUTO: 40.4 % (ref 39–53)
HDLC SERPL-MCNC: 28 MG/DL (ref 40–59)
HGB BLD-MCNC: 12.9 G/DL (ref 13–17.5)
IMM GRANULOCYTES # BLD AUTO: 0.02 X10(3) UL (ref 0–1)
IMM GRANULOCYTES NFR BLD: 0.3 %
KETONES UR STRIP.AUTO-MCNC: NEGATIVE MG/DL
LDLC SERPL CALC-MCNC: 56 MG/DL (ref ?–100)
LEUKOCYTE ESTERASE UR QL STRIP.AUTO: 75
LYMPHOCYTES # BLD AUTO: 2.12 X10(3) UL (ref 1–4)
LYMPHOCYTES NFR BLD AUTO: 29.8 %
MCH RBC QN AUTO: 27.2 PG (ref 26–34)
MCHC RBC AUTO-ENTMCNC: 31.9 G/DL (ref 31–37)
MCV RBC AUTO: 85.1 FL (ref 80–100)
MICROALBUMIN UR-MCNC: <0.3 MG/DL
MONOCYTES # BLD AUTO: 0.48 X10(3) UL (ref 0.1–1)
MONOCYTES NFR BLD AUTO: 6.8 %
NEUTROPHILS # BLD AUTO: 4.1 X10 (3) UL (ref 1.5–7.7)
NEUTROPHILS # BLD AUTO: 4.1 X10(3) UL (ref 1.5–7.7)
NEUTROPHILS NFR BLD AUTO: 57.7 %
NITRITE UR QL STRIP.AUTO: NEGATIVE
NONHDLC SERPL-MCNC: 80 MG/DL (ref ?–130)
OSMOLALITY SERPL CALC.SUM OF ELEC: 282 MOSM/KG (ref 275–295)
PH UR STRIP.AUTO: 7 [PH] (ref 5–8)
PLATELET # BLD AUTO: 358 10(3)UL (ref 150–450)
POTASSIUM SERPL-SCNC: 4.5 MMOL/L (ref 3.5–5.1)
PROT SERPL-MCNC: 7.5 G/DL (ref 5.7–8.2)
PROT UR STRIP.AUTO-MCNC: NEGATIVE MG/DL
PSA SERPL-MCNC: 0.64 NG/ML (ref ?–4)
RBC # BLD AUTO: 4.75 X10(6)UL (ref 3.8–5.8)
RBC UR QL AUTO: NEGATIVE
SODIUM SERPL-SCNC: 137 MMOL/L (ref 136–145)
SP GR UR STRIP.AUTO: <1.005 (ref 1–1.03)
T4 SERPL-MCNC: 9.7 UG/DL (ref 4.5–10.9)
TRIGL SERPL-MCNC: 134 MG/DL (ref 30–149)
TSI SER-ACNC: 2.05 UIU/ML (ref 0.55–4.78)
URATE SERPL-MCNC: 3.2 MG/DL (ref 3.7–9.2)
UROBILINOGEN UR STRIP.AUTO-MCNC: NORMAL MG/DL
VIT B12 SERPL-MCNC: 266 PG/ML (ref 211–911)
VIT D+METAB SERPL-MCNC: 17.8 NG/ML (ref 30–100)
VLDLC SERPL CALC-MCNC: 20 MG/DL (ref 0–30)
WBC # BLD AUTO: 7.1 X10(3) UL (ref 4–11)

## 2025-04-18 PROCEDURE — 80053 COMPREHEN METABOLIC PANEL: CPT

## 2025-04-18 PROCEDURE — 82570 ASSAY OF URINE CREATININE: CPT

## 2025-04-18 PROCEDURE — 84443 ASSAY THYROID STIM HORMONE: CPT

## 2025-04-18 PROCEDURE — 85025 COMPLETE CBC W/AUTO DIFF WBC: CPT

## 2025-04-18 PROCEDURE — 83036 HEMOGLOBIN GLYCOSYLATED A1C: CPT

## 2025-04-18 PROCEDURE — 82306 VITAMIN D 25 HYDROXY: CPT

## 2025-04-18 PROCEDURE — 84550 ASSAY OF BLOOD/URIC ACID: CPT

## 2025-04-18 PROCEDURE — 87086 URINE CULTURE/COLONY COUNT: CPT

## 2025-04-18 PROCEDURE — 80061 LIPID PANEL: CPT

## 2025-04-18 PROCEDURE — 81001 URINALYSIS AUTO W/SCOPE: CPT

## 2025-04-18 PROCEDURE — 82746 ASSAY OF FOLIC ACID SERUM: CPT

## 2025-04-18 PROCEDURE — 84153 ASSAY OF PSA TOTAL: CPT

## 2025-04-18 PROCEDURE — 82043 UR ALBUMIN QUANTITATIVE: CPT

## 2025-04-18 PROCEDURE — 82607 VITAMIN B-12: CPT

## 2025-04-18 PROCEDURE — 73560 X-RAY EXAM OF KNEE 1 OR 2: CPT | Performed by: SPECIALIST

## 2025-04-18 PROCEDURE — 36415 COLL VENOUS BLD VENIPUNCTURE: CPT

## 2025-04-18 PROCEDURE — 84436 ASSAY OF TOTAL THYROXINE: CPT

## 2025-06-06 ENCOUNTER — HOSPITAL ENCOUNTER (OUTPATIENT)
Dept: GENERAL RADIOLOGY | Age: 70
Discharge: HOME | End: 2025-06-06
Attending: SPECIALIST
Payer: COMMERCIAL

## 2025-06-06 ENCOUNTER — ORDER TRANSCRIPTION (OUTPATIENT)
Dept: PHYSICAL THERAPY | Facility: HOSPITAL | Age: 70
End: 2025-06-06

## 2025-06-06 DIAGNOSIS — M54.9 UPPER BACK PAIN: Primary | ICD-10-CM

## 2025-06-06 DIAGNOSIS — M54.9 UPPER BACK PAIN: ICD-10-CM

## 2025-06-06 DIAGNOSIS — M54.2 NECK PAIN: ICD-10-CM

## 2025-06-06 DIAGNOSIS — M25.562 LEFT KNEE PAIN: ICD-10-CM

## 2025-06-06 PROCEDURE — 72050 X-RAY EXAM NECK SPINE 4/5VWS: CPT | Performed by: SPECIALIST

## 2025-06-06 PROCEDURE — 72072 X-RAY EXAM THORAC SPINE 3VWS: CPT | Performed by: SPECIALIST

## 2025-06-09 ENCOUNTER — OFFICE VISIT (OUTPATIENT)
Dept: PHYSICAL THERAPY | Age: 70
End: 2025-06-09
Attending: SPECIALIST
Payer: COMMERCIAL

## 2025-06-09 ENCOUNTER — TELEPHONE (OUTPATIENT)
Dept: PHYSICAL THERAPY | Facility: HOSPITAL | Age: 70
End: 2025-06-09

## 2025-06-09 DIAGNOSIS — M54.9 UPPER BACK PAIN: Primary | ICD-10-CM

## 2025-06-09 DIAGNOSIS — M25.562 LEFT KNEE PAIN: ICD-10-CM

## 2025-06-09 PROCEDURE — 97110 THERAPEUTIC EXERCISES: CPT | Performed by: PHYSICAL THERAPIST

## 2025-06-09 PROCEDURE — 97161 PT EVAL LOW COMPLEX 20 MIN: CPT | Performed by: PHYSICAL THERAPIST

## 2025-06-09 NOTE — PROGRESS NOTES
SPINE EVALUATION:     Diagnosis:   Upper back pain (M54.9)  Left knee pain (M25.562) Patient:  Harmeet MUNOZ Edinson (70 year old, male)        Referring Provider: Adam Branham  Today's Date   6/9/2025    Precautions:  Hearing Impairment (Language Barrier)   Date of Evaluation: 06/09/25  Next MD visit: No data recorded  Date of Surgery: n/a     PATIENT SUMMARY   Completed w/ pt's daughter, Jennifer, serving to assist w/ interpretation as they request.  Summary of chief complaints: upper back pain across bilaterally, mid back pain in the center; L knee/ LE pain  History of current condition: sudden onset L knee pain over the last mo - so severe hardly walking - entire leg was paining- given ice pack, helps - feeling better - not as bad. No injury to the knee. Was walking a lot. No swelling to the knee. Back issues 12-15 yrs. Was in [place]- MRI over there- last part before tailbone - disc degeneration. No injuries/ trauma. Morning is better - symptoms start in the afternoon & get worse at night better once sleeps for 2 hrs at night (or sleeps in afternoon).   Pain level: current 0 /10, at worst 7 /10  Description of symptoms: ok w/ stair descent, walking   Prior level of function: back symptoms worsening over time. Reports about 10 yrs ago did have PT in [place] as he had completely stopped walking at this point due to the back pain, was able to walk when completed PT.  Current limitations: stair climbing (ascent), lifting from floor, stand->sit, prolonged standing  Pt goals: \"pain should be gone\"  Red flag signs/symptoms: Pt denies dizziness, drop attacks, dysphagia, dysarthria, diplopia; Pt denies changes in bowel/bladder function, saddle anesthesia; Pt denies pain that wakes in sleep, fever, recent trauma, history of CA, pain unchanged with movement/activity    Past medical history was reviewed with Harmeet.  Significant findings include: bypass surgery 6 yrs ago- \"can lift as much as he is comfortable  w/ the weight\", DM, HTN. No unintentional weight loss/ gain  Imaging/Tests: Xray L knee: FINDINGS:   No acute fracture or dislocation.  Joint spaces and bony alignment are maintained.  Superior patellar enthesopathy.  No significant joint effusion. Xray thoracic spine: FINDINGS:   There is normal thoracic kyphosis.  No significant lateral curvature.  The visualized vertebral body heights are maintained.  Minimal endplate degenerative changes. Median sternotomy changes are noted.  The visualized lung hartman are clear.   Harmeet  has no past medical history on file.  He  has no past surgical history on file.    ASSESSMENT  Harmeet presents to physical therapy evaluation with primary c/o upper back pain across bilaterally, mid back pain in the center; L knee/ LE pain. The results of the objective tests and measures show impairments in posture, gait, flexibility, ROM, strength. Functional deficits include but are not limited to stair climbing (ascent), lifting from floor, stand->sit, prolonged standing. Signs and symptoms are consistent with diagnosis of Upper back pain (M54.9)  Left knee pain (M25.562). Pt and PT discussed evaluation findings, pathology, POC and HEP. Pt voiced understanding and performs HEP correctly without reported pain. Skilled Physical Therapy is medically necessary to address the above impairments and reach functional goals.    OBJECTIVE:      Musculoskeletal:  Observation/Posture: forward head posture; scapular abduction; rounded shoulders; increased thoracic kyphosis (sacral sitting; stance: swayback w/ ant pelvis; B pes planus, B genu varum)   Accessory Motion:   NT  Palpation: TTP along B LS & medial scap mm, B T/L PSM & generally along spinous processes thoracolumbar spine; no TTP B PSIS, no pain provocation to L knee including along patellar tendon/ tib-fem med jt line/ post HS mm     ROM and Strength:  (* denotes performed with pain)  Trunk ROM     Flex Mod restricted     Ext NT     R L     Side bend Mod restricted Mod restricted     Rotation Mod restricted; Magdiel restricted (lumbar; thoracic) Mod restricted; Magdiel restricted (lumbar; thoracic)   ,   Hip   ROM MMT (-/5)    R L R L     Flex (L2) WNL WNL 5 5     Abd NT NT 4- 3+     ER WNL WNL NT NT     IR Mod restricted Mod restricted NT NT    ,   Knee   ROM MMT (-/5)    R L R L     Flex WNL WNL* (ERP both active & passive)- pain post knee; unable to replicate pain w/ palpation 5 5     Ext (L3) WNL WNL 5 5     ,   Ankle/Foot   ROM    R L     DF (L4) 5 5       Flexibility:  UE Flexibility R L     Pec Major mod restricted mod restricted   ,   LE Flexibility R L     Hamstrings mod restricted mod restricted     Piriformis min restricted min restricted       Balance and Functional Mobility:  Gait: pt ambulates on level ground with -- (mild L intermittent crossover pattern; decreased R arm swing, decreased trunk rot R/L).     Today's Treatment and Response:   Pt education was provided on exam findings, treatment diagnosis, treatment plan, expectations, and prognosis.  Pt aware will be sent back to referring provider if symptoms are not adequately resolved w/ PT intervention.    Today's Treatment       6/9/2025   Spine Treatment   Therapeutic Exercise HEP instruction   Therapeutic Exercise Minutes 9   Evaluation Minutes 36   Total Time Of Timed Procedures 9   Total Time Of Service-Based Procedures 36   Total Treatment Time 45   HEP Access Code: [email or web address]  URL: [email or web address]  Date: 06/09/2025  Prepared by: Glo Brewer    Exercises  - Doorway Pec Stretch at 90 Degrees Abduction  - 2 x daily - 7 x weekly - 4 sets - 30 sec hold  - Seated Trunk Rotation - Arms Crossed  - 2 x daily - 7 x weekly - 1 sets - 10 reps - 5 sec hold  - Supine Lower Trunk Rotation  - 2 x daily - 7 x weekly - 1 sets - 10 reps - 5 sec hold  - Hooklying Hamstring Stretch  - 2 x daily - 7 x weekly - 1 sets - 10 reps - 5 sec hold        Patient was instructed in  and issued a HEP for: Access Code: [email or web address]  URL: [email or web address]  Date: 06/09/2025  Prepared by: Glo Brewer    Exercises  - Doorway Pec Stretch at 90 Degrees Abduction  - 2 x daily - 7 x weekly - 4 sets - 30 sec hold  - Seated Trunk Rotation - Arms Crossed  - 2 x daily - 7 x weekly - 1 sets - 10 reps - 5 sec hold  - Supine Lower Trunk Rotation  - 2 x daily - 7 x weekly - 1 sets - 10 reps - 5 sec hold  - Hooklying Hamstring Stretch  - 2 x daily - 7 x weekly - 1 sets - 10 reps - 5 sec hold    Charges:  PT EVAL: Low Complexity, 1TE  In agreement with evaluation findings and clinical rationale, this evaluation involved LOW COMPLEXITY decision making due to no personal factors/comorbidities, 1-2 body structures involved/activity limitations, and stable symptoms as documented in the evaluation.                                                                         PLAN OF CARE:    Goals: (to be met in 8 visits)    Not Met Progress Toward Partially Met Met   Pt will improve L hip Abduction strength to 4/5 to ascend 1 flight of stairs reciprocally without UE assist. [] [] [] []   Pt will improve lumbar spine AROM flexion to Min restricted or better to allow increase ease with bending forward for lifting from floor. [] [] [] []   Pt will report pain at worst 3/10 w/ stand->sit transfers. [] [] [] []   Pt will demo improved standing posture w/o VC's or manual cues for improved body mechanics to report ability to stand >60 minutes without pain for home activities. [] [] [] []   Pt will be independent and compliant with comprehensive HEP to maintain progress achieved in PT [] [] [] []          Frequency / Duration: Patient will be seen 1-2x/week or a total of 8  visits over a 90 day period. Treatment will include: Gait training; Manual Therapy; Neuromuscular Re-education; Therapeutic Activities; Self-Care Home Management; Therapeutic Exercise; Home Exercise Program instruction; Patient/Family  Education    Education or treatment limitation: Communication (language barrier)   Rehab Potential: good     LEFS Score  LEFS Score: (Patient-Rptd) 62.5 % (6/9/2025  9:18 AM)      Patient/Family/Caregiver was advised of these findings, precautions, and treatment options and has agreed to actively participate in planning and for this course of care.    Thank you for your referral. Please co-sign or sign and return this letter via fax as soon as possible to [phone]. If you have any questions, please contact me at Dept: [phone]    Sincerely,  Electronically signed by therapist: Glo Brewer PT  Physician's certification required: Yes  I certify the need for these services furnished under this plan of treatment and while under my care.    X___________________________________________________ Date____________________    Certification From: 6/9/2025  To: 9/7/2025

## 2025-06-13 ENCOUNTER — OFFICE VISIT (OUTPATIENT)
Dept: PHYSICAL THERAPY | Age: 70
End: 2025-06-13
Attending: SPECIALIST
Payer: COMMERCIAL

## 2025-06-13 PROCEDURE — 97110 THERAPEUTIC EXERCISES: CPT | Performed by: PHYSICAL THERAPIST

## 2025-06-16 ENCOUNTER — OFFICE VISIT (OUTPATIENT)
Dept: PHYSICAL THERAPY | Age: 70
End: 2025-06-16
Attending: SPECIALIST
Payer: COMMERCIAL

## 2025-06-16 PROCEDURE — 97110 THERAPEUTIC EXERCISES: CPT

## 2025-06-16 PROCEDURE — 97140 MANUAL THERAPY 1/> REGIONS: CPT

## 2025-06-16 NOTE — PROGRESS NOTES
Patient: Harmeet Neves (70 year old, male) Referring Provider:  Insurance:   Diagnosis:   [facility]  [facility]   Date of Surgery: No data recorded Next MD visit:  N/A   Precautions:  Hearing Impairment (Language Barrier) No data recorded Referral Information:    Date of Evaluation: Req: 5, Auth: 5, Exp: 8/31/2025    No data recorded POC Auth Visits:          Today's Date   6/16/2025    Subjective  \"My mid back feels 50 % better since the stasr of PT but my left knee continue to hurts on the back 7/10 when I try to bend it . I have hard time with stairs negotiation due to tightness and pain on the back of my knee . Current pain behing my L knee \".       Pain: 7/10     Objective                 Assessment  Initiated STM to the posterior L knee to address patient c/o of lateral knee pain and connective tissue tightness with bending activites with significant decrease in posterior knee  tightness following manual intrevention ( as per patient subjective report ).Conitnued with core and thoracic spine stabilization activites and added yellow TB to scapula strength activites performed in WB to further challenge functional trunk stability for improved posture with improved body mechnics with standing prolonged home  activites in order to decrease pain .Provided verbal and visual cues to ensure correct scapula recruitment .    Goals (to be met in  8 visits  )     Not Met Progress Toward Partially Met Met    Pt will improve L hip Abduction strength to 4/5 to ascend 1 flight of stairs reciprocally without UE assist. []  []  []  []    Pt will improve lumbar spine AROM flexion to Min restricted or better to allow increase ease with bending forward for lifting from floor. []  []  []  []    Pt will report pain at worst 3/10 w/ stand->sit transfers. []  []  []  []    Pt will demo improved standing posture w/o VC's or manual cues for improved body mechanics to report ability to stand >60 minutes without pain for  home activities. []  []  []  []    Pt will be independent and compliant with comprehensive HEP to maintain progress achieved in PT []  []  []  []                                                                                                                       Plan :     Continue PT as per current POC   Treatment Last 4 Visits  Treatment Day: 3       6/16/2025   LE Treatment   Therapeutic Exercise NU step x 6 min , level 5  Standing :  Doorway stretch for pectoral mm   10 x 10 sec hold   Seated :  Thoracic AROM of rotation 10 x 10 sec hold   Thoracic ext over yellow ball 2 x 10   Standing against wall :  B scapula retraction with yellow TB :  B sh ext 2 x 10   B sh horizontal abd 2 x 10   B sh ext 2 x 10   Supine :  LTR with lower legs on SB 2 x 10   TrA activation with :  Adductors activation 2 x 10   Abductors activation with fitness ring 2 x 10   Piriformis stretches ( ER /IR ) 10 x 10 sec hold   L HS stretch with strap 10 x 10 sec hold   L clam shell with 1 lbs 2 x 10   L hip abduction 2 x 10   L knee TKE gentle stretch x 2 min  Standing :  B gastroc stretch x 5 x 30 sec hold      Manual Therapy STM to L knee with patient in prone position   Supine :  PROM L knee flexion / extension with gentle end range stretch    Therapeutic Exercise Minutes 30   Manual Therapy Minutes 15   Total Time Of Timed Procedures 45   Total Time Of Service-Based Procedures 0   Total Treatment Time 45        HEP       Charges     manual x 1 , there - ex x 3

## 2025-06-20 ENCOUNTER — OFFICE VISIT (OUTPATIENT)
Dept: PHYSICAL THERAPY | Age: 70
End: 2025-06-20
Attending: SPECIALIST
Payer: COMMERCIAL

## 2025-06-20 PROCEDURE — 97140 MANUAL THERAPY 1/> REGIONS: CPT

## 2025-06-20 PROCEDURE — 97110 THERAPEUTIC EXERCISES: CPT

## 2025-06-20 NOTE — PROGRESS NOTES
Patient: Harmeet Neves (70 year old, male) Referring Provider:  Insurance:   Diagnosis:   [facility]   Date of Surgery: No data recorded Next MD visit:  N/A   Precautions:  Hearing Impairment (Language Barrier) No data recorded Referral Information:    Date of Evaluation: Req: 5, Auth: 5, Exp: 8/31/2025    No data recorded POC Auth Visits:          Today's Date   6/20/2025    Subjective  as per family member member \" The back of my left pio feels much better and I am able to cross my leg with much less pain and tightness . My both scapulas feel tight but I do not have any pain . My lower back hurts but I am able to perform light ADLs with ease .\"       Pain: 4/10     Objective                 Assessment  Conitnued with STM/ ISTM to the L posterior knee to further decrease connective tissue tightness in order to promote more more symmetrical gait pattern and to increase ease during crossed leg sitting . Continued with distal trunk and B LE stability and incorported 1.5 lbs to clam shell and step up / lateral up on to 6 inch step to progress glut med and quad functiona stabiilty with gait and with stairs negotiation .Patient reported significant decrease in posterior knee tightness with gait after this session .    Goals (to be met in 8 VISITS )    Not Met Progress Toward Partially Met Met   Pt will improve L hip Abduction strength to 4/5 to ascend 1 flight of stairs reciprocally without UE assist. []  []  []  []    Pt will improve lumbar spine AROM flexion to Min restricted or better to allow increase ease with bending forward for lifting from floor. []  []  []  []    Pt will report pain at worst 3/10 w/ stand->sit transfers. []  []  []  []    Pt will demo improved standing posture w/o VC's or manual cues for improved body mechanics to report ability to stand >60 minutes without pain for home activities. []  []  []  []    Pt will be independent and compliant with comprehensive HEP to  maintain progress achieved in PT []  []  []  []                Plan  Continue PT as per current POC .    Treatment Last 4 Visits  Treatment Day: 4       6/16/2025 6/20/2025   LE Treatment   Therapeutic Exercise NU step x 6 min , level 5  Standing :  Doorway stretch for pectoral mm   10 x 10 sec hold   Seated :  Thoracic AROM of rotation 10 x 10 sec hold   Thoracic ext over yellow ball 2 x 10   Standing against wall :  B scapula retraction with yellow TB :  B sh ext 2 x 10   B sh horizontal abd 2 x 10   B sh ext 2 x 10   Supine :  LTR with lower legs on SB 2 x 10   TrA activation with :  Adductors activation 2 x 10   Abductors activation with fitness ring 2 x 10   Piriformis stretches ( ER /IR ) 10 x 10 sec hold   L HS stretch with strap 10 x 10 sec hold   L clam shell with 1 lbs 2 x 10   L hip abduction 2 x 10   L knee TKE gentle stretch x 2 min  Standing :  B gastroc stretch x 5 x 30 sec hold    NU step x 6 min , level 5  Supine :  QS X 20   SLR with QS 2 x 10   L TKE on bolster with 1.5 lbs 2 x 10  L HS / gastorc stretch with strap 10 x 10 sec hold   LTR 10 x 10   L piriformis stretch ( ER/IR ) 10 x 10 sec hold   Tra activation with :  Adductors activation 2 x 10   Abductors activation 2 x 10   Side lying ;  L clam shell with 1.5 lbs 2 x 10   L hip abduction 2 x 10 standing :  B gastroc stretch on slant board 5 x 30 sec hold   B heel raises x 20  B toes raises x 20  Step up / lateral step up on 4 inch step 2 x 10 ea   TKE  L on wall against yellow ball 2 x 10   B pectoral stretch in doorway 10 x 10 sec hold   B sh rows with red TB 2 x 10   B sh ext with red TB 2 x 10   B sh horizontal abd with red TB 2 x 10    Manual Therapy STM to L knee with patient in prone position   Supine :  PROM L knee flexion / extension with gentle end range stretch  PRONE :  STM / ISTM to L posterior knee followed by manual knee extension stretch    Therapeutic Exercise Minutes 30 45   Manual Therapy Minutes 15 15   Total Time Of Timed  Procedures 45 60   Total Time Of Service-Based Procedures 0 0   Total Treatment Time 45 60        HEP       Charges     manual x 1 , there - ex x 3

## 2025-06-25 ENCOUNTER — OFFICE VISIT (OUTPATIENT)
Dept: PHYSICAL THERAPY | Age: 70
End: 2025-06-25
Attending: SPECIALIST
Payer: COMMERCIAL

## 2025-06-25 PROCEDURE — 97110 THERAPEUTIC EXERCISES: CPT | Performed by: PHYSICAL THERAPIST

## 2025-06-25 NOTE — PROGRESS NOTES
Patient: Harmeet Neves (70 year old, male) Referring Provider:  Insurance:   Diagnosis: Upper back pain (M54.9)  Left knee pain (M25.562) Adam Branham  [facility]   Date of Surgery: n/a Next MD visit:  N/A   Precautions:  Hearing Impairment (Language Barrier) No data recorded Referral Information:    Date of Evaluation: Req: 5, Auth: 5, Exp: 8/31/2025 06/09/25 POC Auth Visits:  8       Today's Date   6/25/2025    Subjective  Per pt's daughter- knee is feeling better- recent treatment helped the knee, back is the same, can move his knee \"to here\" (demo) w/ 70% improvement reported.       Pain: 5/10 (per pt's daughter current back pain 4-5/10, was 6-7/10 abiout 15' ago)     Objective  see flowsheet for details       Assessment  Cued to avoid compensatory trunk lean w/ standing hip Abd w/ TheraBand. Cont to utilize manual cues & demo to aid w/ facilitation of proper form.     Goals (to be met in 8 visits)      Not Met Progress Toward Partially Met Met   Pt will improve L hip Abduction strength to 4/5 to ascend 1 flight of stairs reciprocally without UE assist. [] [] [] []   Pt will improve lumbar spine AROM flexion to Min restricted or better to allow increase ease with bending forward for lifting from floor. [] [] [] []   Pt will report pain at worst 3/10 w/ stand->sit transfers. [] [] [] []   Pt will demo improved standing posture w/o VC's or manual cues for improved body mechanics to report ability to stand >60 minutes without pain for home activities. [] [] [] []   Pt will be independent and compliant with comprehensive HEP to maintain progress achieved in PT [] [] [] []              Plan  Continue PT as per current POC .    Treatment Last 4 Visits  Treatment Day: 5       6/9/2025 6/13/2025 6/25/2025   Spine Treatment   Therapeutic Exercise HEP instruction NuStep L3 X 5'  Standing:  -doorway pec stretch 4 X 30\" B  Seated:  -trunk rot stretch 5\" X 10 R/L  -scap retractions X 20 w/ min  A  Supine:  -LTR 5\" X 3' total  -hooklying active HS stretch 5\" X 10 R/L  -hooklying hip Abd diana Green pilates ring 5\" X 20  -crossover piriformis stretch 2 X 30\" R/L  S/L:  -clams X 20 R/L NuStep L5 X 5'  Standing:  -B heel raises x 30  -B toes raises x 30  -hip Abd w/ YTB loop X 20 R/L  -hip Ext w/ YTB loop X 20 R/L  -CW //bars X 4 rounds YTB loop  -Fwd MW //bars X 2 rounds YTB loop  -sport cord: row X 20 Red; shld ext X 20 Red, shld horiz Abd X 20 Red  Supine:  -hip Abd diana black pilates ring->Blue TB X 30  -hip Add diana yellow ball X 30  -LTR X 20  -L SLR X 20  -bridge X 20  -L HS stretch w/ strap 3 X 30\"   Therapeutic Exercise Minutes 9 38 40   Evaluation Minutes 36     Total Time Of Timed Procedures 9 38 40   Total Time Of Service-Based Procedures 36 0 0   Total Treatment Time 45 38 40   HEP Access Code: [email or web address]  URL: [email or web address]  Date: 06/09/2025  Prepared by: Glo Brewer    Exercises  - Doorway Pec Stretch at 90 Degrees Abduction  - 2 x daily - 7 x weekly - 4 sets - 30 sec hold  - Seated Trunk Rotation - Arms Crossed  - 2 x daily - 7 x weekly - 1 sets - 10 reps - 5 sec hold  - Supine Lower Trunk Rotation  - 2 x daily - 7 x weekly - 1 sets - 10 reps - 5 sec hold  - Hooklying Hamstring Stretch  - 2 x daily - 7 x weekly - 1 sets - 10 reps - 5 sec hold          HEP  Access Code: [email or web address]  URL: [email or web address]  Date: 06/09/2025  Prepared by: Glo Brewer    Exercises  - Doorway Pec Stretch at 90 Degrees Abduction  - 2 x daily - 7 x weekly - 4 sets - 30 sec hold  - Seated Trunk Rotation - Arms Crossed  - 2 x daily - 7 x weekly - 1 sets - 10 reps - 5 sec hold  - Supine Lower Trunk Rotation  - 2 x daily - 7 x weekly - 1 sets - 10 reps - 5 sec hold  - Hooklying Hamstring Stretch  - 2 x daily - 7 x weekly - 1 sets - 10 reps - 5 sec hold    Charges  3TE

## 2025-06-27 ENCOUNTER — OFFICE VISIT (OUTPATIENT)
Dept: PHYSICAL THERAPY | Age: 70
End: 2025-06-27
Attending: SPECIALIST
Payer: COMMERCIAL

## 2025-06-27 PROCEDURE — 97110 THERAPEUTIC EXERCISES: CPT | Performed by: PHYSICAL THERAPIST

## 2025-07-02 ENCOUNTER — OFFICE VISIT (OUTPATIENT)
Dept: PHYSICAL THERAPY | Age: 70
End: 2025-07-02
Attending: SPECIALIST
Payer: COMMERCIAL

## 2025-07-02 PROCEDURE — 97110 THERAPEUTIC EXERCISES: CPT

## 2025-07-02 NOTE — PROGRESS NOTES
Patient: Harmeet Neves (70 year old, male) Referring Provider:  Insurance:   Diagnosis:   [facility]   Date of Surgery: No data recorded Next MD visit:  N/A   Precautions:  Hearing Impairment (Language Barrier) No data recorded Referral Information:    Date of Evaluation: Req: 8, Auth: 8, Exp: 8/31/2025    No data recorded POC Auth Visits:          Today's Date   7/2/2025    Subjective  \" My lower back and knee feels 80 % better since the start of PT .       Pain: 0/10     Objective                 Assessment  Increased reps and resistance on some of the hips and knees strength activites in supine and in WB and incorporated leg press on the shuttle to further progress functional strengh of glut med and quads for improved mechanics with gait and with stairs negotiation . Patient was able to complete all given intervention with out report of increase in current LB or knee pain level .    Goals (to be met in  8 visits )   Pt will improve L hip Abduction strength to 4/5 to ascend 1 flight of stairs reciprocally without UE assist. []  []  []  []    Pt will improve lumbar spine AROM flexion to Min restricted or better to allow increase ease with bending forward for lifting from floor. []  []  []  []    Pt will report pain at worst 3/10 w/ stand->sit transfers. []  []  []  []    Pt will demo improved standing posture w/o VC's or manual cues for improved body mechanics to report ability to stand >60 minutes without pain for home activities. []  []  []  []    Pt will be independent and compliant with comprehensive HEP to maintain progress achieved in PT []  []  []  []          Plan  Will anticipate discharge after next session ( as per PT / PTA collaboration )    Treatment Last 4 Visits  Treatment Day: 5       6/16/2025 6/20/2025 7/2/2025   LE Treatment   Therapeutic Exercise NU step x 6 min , level 5  Standing :  Doorway stretch for pectoral mm   10 x 10 sec hold   Seated :  Thoracic AROM of  rotation 10 x 10 sec hold   Thoracic ext over yellow ball 2 x 10   Standing against wall :  B scapula retraction with yellow TB :  B sh ext 2 x 10   B sh horizontal abd 2 x 10   B sh ext 2 x 10   Supine :  LTR with lower legs on SB 2 x 10   TrA activation with :  Adductors activation 2 x 10   Abductors activation with fitness ring 2 x 10   Piriformis stretches ( ER /IR ) 10 x 10 sec hold   L HS stretch with strap 10 x 10 sec hold   L clam shell with 1 lbs 2 x 10   L hip abduction 2 x 10   L knee TKE gentle stretch x 2 min  Standing :  B gastroc stretch x 5 x 30 sec hold    NU step x 6 min , level 5  Supine :  QS X 20   SLR with QS 2 x 10   L TKE on bolster with 1.5 lbs 2 x 10  L HS / gastorc stretch with strap 10 x 10 sec hold   LTR 10 x 10   L piriformis stretch ( ER/IR ) 10 x 10 sec hold   Tra activation with :  Adductors activation 2 x 10   Abductors activation 2 x 10   Side lying ;  L clam shell with 1.5 lbs 2 x 10   L hip abduction 2 x 10 standing :  B gastroc stretch on slant board 5 x 30 sec hold   B heel raises x 20  B toes raises x 20  Step up / lateral step up on 4 inch step 2 x 10 ea   TKE  L on wall against yellow ball 2 x 10   B pectoral stretch in doorway 10 x 10 sec hold   B sh rows with red TB 2 x 10   B sh ext with red TB 2 x 10   B sh horizontal abd with red TB 2 x 10  NU step x 6 min , level 4   TKE on bolster with 2 lbs 2 x 15   L SLR with 1.5 lbs 2 x 15   LTR with lower legs on SB 2 x 10   Bridging 2 x 10   Bridging with adductors activation 2 x 10   Bridging with abductors with abductors activation 2 x 10   Sit to stand from mat table with no arm support 2 x 10   Standing :  B gastroc stretch on slant board 5 x 20 sec hold   B heel raises x 20  B toes raises x 20  Shuttle :  B leg press with 25 lbs 2 x 15   R/L leg press with 12 lbs 2 x 15 ea   Step up / lateral step up on 6 inch step 2 x 10 ea  Side stepping in // bars with red TB x 4 rounds   Air ex :  Marches x 20   SLS R/L x 10 sec x 3  times ea     Manual Therapy STM to L knee with patient in prone position   Supine :  PROM L knee flexion / extension with gentle end range stretch  PRONE :  STM / ISTM to L posterior knee followed by manual knee extension stretch     Therapeutic Exercise Minutes 30 45 55   Manual Therapy Minutes 15 15    Total Time Of Timed Procedures 45 60 55   Total Time Of Service-Based Procedures 0 0 0   Total Treatment Time 45 60 55        HEP       Charges     there - ex x 4

## 2025-07-09 ENCOUNTER — APPOINTMENT (OUTPATIENT)
Dept: PHYSICAL THERAPY | Age: 70
End: 2025-07-09
Attending: SPECIALIST
Payer: COMMERCIAL

## 2025-07-09 ENCOUNTER — HOSPITAL ENCOUNTER (OUTPATIENT)
Dept: ULTRASOUND IMAGING | Age: 70
Discharge: HOME | End: 2025-07-09
Attending: SPECIALIST
Payer: COMMERCIAL

## 2025-07-09 DIAGNOSIS — R10.84 GENERALIZED ABDOMINAL PAIN: ICD-10-CM

## 2025-07-09 PROCEDURE — 76700 US EXAM ABDOM COMPLETE: CPT | Performed by: SPECIALIST

## 2025-07-11 ENCOUNTER — OFFICE VISIT (OUTPATIENT)
Dept: PHYSICAL THERAPY | Age: 70
End: 2025-07-11
Attending: SPECIALIST
Payer: COMMERCIAL

## 2025-07-11 PROCEDURE — 97110 THERAPEUTIC EXERCISES: CPT

## 2025-07-11 NOTE — PROGRESS NOTES
Patient: Harmeet Neves (70 year old, male) Referring Provider:  Insurance:   Diagnosis:   [facility]   Date of Surgery: No data recorded Next MD visit:  N/A   Precautions:  Hearing Impairment (Language Barrier) No data recorded Referral Information:    Date of Evaluation: Req: 8, Auth: 8, Exp: 8/31/2025    No data recorded POC Auth Visits:          Today's Date   7/11/2025    Subjective  \" My lower back feels 25 % better and my knee feels 60 % better since the start of PT .\"       Pain: 0/10     Objective  AROM of lumbar trunk : flexion min loss, extension NT , side bend R min loss , L min loss , rotation R mod loss , L mod loss.MMT R hip : flexion 5/5 , abduction 4/5 , L hip : flexion 5/5 , abduction 4/5 .              Assessment  Patient demonstrates improved AROM of lumbar spine and strength in both hip since the start of PT . Patient reports being able to ambulate longer distances without knee pain and without feeling tired . Patient demonstrates compliance in his final HEP .    Goals (to be met in 8 visits  )     Pt will improve L hip Abduction strength to 4/5 to ascend 1 flight of stairs reciprocally without UE assist. []  []  []  []    Pt will improve lumbar spine AROM flexion to Min restricted or better to allow increase ease with bending forward for lifting from floor. []  []  []  []    Pt will report pain at worst 3/10 w/ stand->sit transfers. []  []  []  []    Pt will demo improved standing posture w/o VC's or manual cues for improved body mechanics to report ability to stand >60 minutes without pain for home activities. []  []  []  []    Pt will be independent and compliant with comprehensive HEP to maintain progress achieved in PT []  []  []  []              Plan  Discontinue PT  ,    Treatment Last 4 Visits  Treatment Day: 6       6/16/2025 6/20/2025 7/2/2025 7/11/2025   LE Treatment   Therapeutic Exercise NU step x 6 min , level 5  Standing :  Doorway stretch for pectoral  mm   10 x 10 sec hold   Seated :  Thoracic AROM of rotation 10 x 10 sec hold   Thoracic ext over yellow ball 2 x 10   Standing against wall :  B scapula retraction with yellow TB :  B sh ext 2 x 10   B sh horizontal abd 2 x 10   B sh ext 2 x 10   Supine :  LTR with lower legs on SB 2 x 10   TrA activation with :  Adductors activation 2 x 10   Abductors activation with fitness ring 2 x 10   Piriformis stretches ( ER /IR ) 10 x 10 sec hold   L HS stretch with strap 10 x 10 sec hold   L clam shell with 1 lbs 2 x 10   L hip abduction 2 x 10   L knee TKE gentle stretch x 2 min  Standing :  B gastroc stretch x 5 x 30 sec hold    NU step x 6 min , level 5  Supine :  QS X 20   SLR with QS 2 x 10   L TKE on bolster with 1.5 lbs 2 x 10  L HS / gastorc stretch with strap 10 x 10 sec hold   LTR 10 x 10   L piriformis stretch ( ER/IR ) 10 x 10 sec hold   Tra activation with :  Adductors activation 2 x 10   Abductors activation 2 x 10   Side lying ;  L clam shell with 1.5 lbs 2 x 10   L hip abduction 2 x 10 standing :  B gastroc stretch on slant board 5 x 30 sec hold   B heel raises x 20  B toes raises x 20  Step up / lateral step up on 4 inch step 2 x 10 ea   TKE  L on wall against yellow ball 2 x 10   B pectoral stretch in doorway 10 x 10 sec hold   B sh rows with red TB 2 x 10   B sh ext with red TB 2 x 10   B sh horizontal abd with red TB 2 x 10  NU step x 6 min , level 4   TKE on bolster with 2 lbs 2 x 15   L SLR with 1.5 lbs 2 x 15   LTR with lower legs on SB 2 x 10   Bridging 2 x 10   Bridging with adductors activation 2 x 10   Bridging with abductors with abductors activation 2 x 10   Sit to stand from mat table with no arm support 2 x 10   Standing :  B gastroc stretch on slant board 5 x 20 sec hold   B heel raises x 20  B toes raises x 20  Shuttle :  B leg press with 25 lbs 2 x 15   R/L leg press with 12 lbs 2 x 15 ea   Step up / lateral step up on 6 inch step 2 x 10 ea  Side stepping in // bars with red TB x 4 rounds    Air ex :  Marches x 20   SLS R/L x 10 sec x 3 times ea   NU STEP x 6 min , level 5   HEP EDUCATION AND PERFORMANCE :  Supine ;:  LTR 2 x 10   SKTC 2 X 10   SLR 2 x 10   Bridging 2 x 10   Seated :  TKE 2 x 10   Side stepping with red TB 40 ft   Step up / lateral step up on 6 inch step 2 x 10   Manual Therapy STM to L knee with patient in prone position   Supine :  PROM L knee flexion / extension with gentle end range stretch  PRONE :  STM / ISTM to L posterior knee followed by manual knee extension stretch      Therapeutic Exercise Minutes 30 45 55 45   Manual Therapy Minutes 15 15     Total Time Of Timed Procedures 45 60 55 45   Total Time Of Service-Based Procedures 0 0 0 0   Total Treatment Time 45 60 55 45        HEP       Charges     there - ex x 3

## 2025-08-30 ENCOUNTER — LAB ENCOUNTER (OUTPATIENT)
Dept: LAB | Age: 70
End: 2025-08-30
Attending: SPECIALIST

## 2025-08-30 DIAGNOSIS — E11.9 DIABETES MELLITUS (HCC): ICD-10-CM

## 2025-08-30 DIAGNOSIS — R97.20 ELEVATED PROSTATE SPECIFIC ANTIGEN (PSA): ICD-10-CM

## 2025-08-30 DIAGNOSIS — E78.5 HYPERLIPIDEMIA: ICD-10-CM

## 2025-08-30 DIAGNOSIS — Z00.00 ROUTINE GENERAL MEDICAL EXAMINATION AT A HEALTH CARE FACILITY: Primary | ICD-10-CM

## 2025-08-30 DIAGNOSIS — E55.9 VITAMIN D DEFICIENCY DISEASE: ICD-10-CM

## 2025-08-30 DIAGNOSIS — R53.81 DEBILITY, UNSPECIFIED: ICD-10-CM

## 2025-08-30 DIAGNOSIS — E53.8 BIOTIN-(PROPIONYL-COA-CARBOXYLASE) LIGASE DEFICIENCY: ICD-10-CM

## 2025-08-30 DIAGNOSIS — R79.9 ABNORMAL BLOOD CHEMISTRY: ICD-10-CM

## 2025-08-30 LAB
ALBUMIN SERPL-MCNC: 4.8 G/DL (ref 3.2–4.8)
ALBUMIN/GLOB SERPL: 2 (ref 1–2)
ALP LIVER SERPL-CCNC: 51 U/L (ref 45–117)
ALT SERPL-CCNC: 19 U/L (ref 10–49)
ANION GAP SERPL CALC-SCNC: 11 MMOL/L (ref 0–18)
AST SERPL-CCNC: 28 U/L (ref ?–34)
BASOPHILS # BLD AUTO: 0.04 X10(3) UL (ref 0–0.2)
BASOPHILS NFR BLD AUTO: 0.7 %
BILIRUB SERPL-MCNC: 0.4 MG/DL (ref 0.2–1.1)
BILIRUB UR QL STRIP.AUTO: NEGATIVE
BUN BLD-MCNC: 9 MG/DL (ref 9–23)
CALCIUM BLD-MCNC: 9.8 MG/DL (ref 8.7–10.6)
CHLORIDE SERPL-SCNC: 102 MMOL/L (ref 98–112)
CHOLEST SERPL-MCNC: 105 MG/DL (ref ?–200)
CLARITY UR REFRACT.AUTO: CLEAR
CO2 SERPL-SCNC: 24 MMOL/L (ref 21–32)
COLOR UR AUTO: COLORLESS
CREAT BLD-MCNC: 0.97 MG/DL (ref 0.7–1.3)
EGFRCR SERPLBLD CKD-EPI 2021: 85 ML/MIN/1.73M2 (ref 60–?)
EOSINOPHIL # BLD AUTO: 0.38 X10(3) UL (ref 0–0.7)
EOSINOPHIL NFR BLD AUTO: 6.9 %
ERYTHROCYTE [DISTWIDTH] IN BLOOD BY AUTOMATED COUNT: 14.5 %
FASTING PATIENT LIPID ANSWER: YES
FASTING STATUS PATIENT QL REPORTED: YES
FOLATE SERPL-MCNC: 8.1 NG/ML (ref 5.4–?)
GLOBULIN PLAS-MCNC: 2.4 G/DL (ref 2–3.5)
GLUCOSE BLD-MCNC: 106 MG/DL (ref 70–99)
GLUCOSE UR STRIP.AUTO-MCNC: NORMAL MG/DL
HCT VFR BLD AUTO: 38.3 % (ref 39–53)
HDLC SERPL-MCNC: 28 MG/DL (ref 40–59)
HGB BLD-MCNC: 12.6 G/DL (ref 13–17.5)
IMM GRANULOCYTES # BLD AUTO: 0.01 X10(3) UL (ref 0–1)
IMM GRANULOCYTES NFR BLD: 0.2 %
KETONES UR STRIP.AUTO-MCNC: NEGATIVE MG/DL
LDLC SERPL CALC-MCNC: 58 MG/DL (ref ?–100)
LEUKOCYTE ESTERASE UR QL STRIP.AUTO: NEGATIVE
LYMPHOCYTES # BLD AUTO: 2 X10(3) UL (ref 1–4)
LYMPHOCYTES NFR BLD AUTO: 36.2 %
MCH RBC QN AUTO: 27.6 PG (ref 26–34)
MCHC RBC AUTO-ENTMCNC: 32.9 G/DL (ref 31–37)
MCV RBC AUTO: 83.8 FL (ref 80–100)
MONOCYTES # BLD AUTO: 0.66 X10(3) UL (ref 0.1–1)
MONOCYTES NFR BLD AUTO: 11.9 %
NEUTROPHILS # BLD AUTO: 2.44 X10 (3) UL (ref 1.5–7.7)
NEUTROPHILS # BLD AUTO: 2.44 X10(3) UL (ref 1.5–7.7)
NEUTROPHILS NFR BLD AUTO: 44.1 %
NITRITE UR QL STRIP.AUTO: NEGATIVE
NONHDLC SERPL-MCNC: 77 MG/DL (ref ?–130)
OSMOLALITY SERPL CALC.SUM OF ELEC: 283 MOSM/KG (ref 275–295)
PH UR STRIP.AUTO: 6.5 (ref 5–8)
PLATELET # BLD AUTO: 326 10(3)UL (ref 150–450)
POTASSIUM SERPL-SCNC: 4.6 MMOL/L (ref 3.5–5.1)
PROT SERPL-MCNC: 7.2 G/DL (ref 5.7–8.2)
PROT UR STRIP.AUTO-MCNC: NEGATIVE MG/DL
PSA SERPL-MCNC: 0.68 NG/ML (ref ?–4)
RBC # BLD AUTO: 4.57 X10(6)UL (ref 3.8–5.8)
RBC UR QL AUTO: NEGATIVE
SODIUM SERPL-SCNC: 137 MMOL/L (ref 136–145)
SP GR UR STRIP.AUTO: <1.005 (ref 1–1.03)
T4 SERPL-MCNC: 8.6 UG/DL (ref 4.5–10.9)
TRIGL SERPL-MCNC: 98 MG/DL (ref 30–149)
TSI SER-ACNC: 1.75 UIU/ML (ref 0.55–4.78)
URATE SERPL-MCNC: 3.4 MG/DL (ref 3.7–9.2)
UROBILINOGEN UR STRIP.AUTO-MCNC: NORMAL MG/DL
VIT B12 SERPL-MCNC: 211 PG/ML (ref 211–911)
VIT D+METAB SERPL-MCNC: 21.5 NG/ML (ref 30–100)
VLDLC SERPL CALC-MCNC: 14 MG/DL (ref 0–30)
WBC # BLD AUTO: 5.5 X10(3) UL (ref 4–11)

## 2025-08-30 PROCEDURE — 82306 VITAMIN D 25 HYDROXY: CPT

## 2025-08-30 PROCEDURE — 80053 COMPREHEN METABOLIC PANEL: CPT

## 2025-08-30 PROCEDURE — 84436 ASSAY OF TOTAL THYROXINE: CPT

## 2025-08-30 PROCEDURE — 85025 COMPLETE CBC W/AUTO DIFF WBC: CPT

## 2025-08-30 PROCEDURE — 81003 URINALYSIS AUTO W/O SCOPE: CPT

## 2025-08-30 PROCEDURE — 82607 VITAMIN B-12: CPT

## 2025-08-30 PROCEDURE — 84153 ASSAY OF PSA TOTAL: CPT

## 2025-08-30 PROCEDURE — 36415 COLL VENOUS BLD VENIPUNCTURE: CPT

## 2025-08-30 PROCEDURE — 80061 LIPID PANEL: CPT

## 2025-08-30 PROCEDURE — 82746 ASSAY OF FOLIC ACID SERUM: CPT

## 2025-08-30 PROCEDURE — 84443 ASSAY THYROID STIM HORMONE: CPT

## 2025-08-30 PROCEDURE — 84550 ASSAY OF BLOOD/URIC ACID: CPT
